# Patient Record
Sex: FEMALE | Race: WHITE
[De-identification: names, ages, dates, MRNs, and addresses within clinical notes are randomized per-mention and may not be internally consistent; named-entity substitution may affect disease eponyms.]

---

## 2023-03-16 ENCOUNTER — HOSPITAL ENCOUNTER (OUTPATIENT)
Dept: HOSPITAL 4 - SDS | Age: 38
Discharge: HOME | End: 2023-03-16
Attending: SURGERY
Payer: COMMERCIAL

## 2023-03-16 VITALS — HEIGHT: 59 IN | WEIGHT: 110 LBS | BODY MASS INDEX: 22.18 KG/M2

## 2023-03-16 VITALS — SYSTOLIC BLOOD PRESSURE: 108 MMHG

## 2023-03-16 DIAGNOSIS — Z20.822: ICD-10-CM

## 2023-03-16 DIAGNOSIS — K43.2: Primary | ICD-10-CM

## 2023-03-16 DIAGNOSIS — E03.9: ICD-10-CM

## 2023-03-16 LAB — HCG UR QL: NEGATIVE

## 2023-03-16 PROCEDURE — 36415 COLL VENOUS BLD VENIPUNCTURE: CPT

## 2023-03-16 PROCEDURE — 49623 RMVL NINFCT MESH HERNIA RPR: CPT

## 2023-03-16 PROCEDURE — 87426 SARSCOV CORONAVIRUS AG IA: CPT

## 2023-03-16 PROCEDURE — 88302 TISSUE EXAM BY PATHOLOGIST: CPT

## 2023-03-16 PROCEDURE — 84703 CHORIONIC GONADOTROPIN ASSAY: CPT

## 2023-03-16 PROCEDURE — C1781 MESH (IMPLANTABLE): HCPCS

## 2023-03-16 PROCEDURE — 49615 RPR AA HRN RCR 3-10 RDC: CPT

## 2023-03-16 PROCEDURE — C9290 INJ, BUPIVACAINE LIPOSOME: HCPCS

## 2023-03-16 PROCEDURE — 87081 CULTURE SCREEN ONLY: CPT

## 2025-07-23 ENCOUNTER — OFFICE (OUTPATIENT)
Dept: URBAN - METROPOLITAN AREA CLINIC 6 | Facility: CLINIC | Age: 40
End: 2025-07-23

## 2025-07-23 VITALS
HEIGHT: 59 IN | HEART RATE: 101 BPM | DIASTOLIC BLOOD PRESSURE: 88 MMHG | SYSTOLIC BLOOD PRESSURE: 136 MMHG | WEIGHT: 135 LBS

## 2025-07-23 DIAGNOSIS — R10.31 BILATERAL LOWER ABDOMINAL CRAMPING: ICD-10-CM

## 2025-07-23 DIAGNOSIS — R19.4 CHANGE IN BOWEL HABITS: ICD-10-CM

## 2025-07-23 DIAGNOSIS — F43.9 STRESS: ICD-10-CM

## 2025-07-23 PROCEDURE — G2211 COMPLEX E/M VISIT ADD ON: HCPCS | Performed by: INTERNAL MEDICINE

## 2025-07-23 PROCEDURE — 99204 OFFICE O/P NEW MOD 45 MIN: CPT | Performed by: INTERNAL MEDICINE

## 2025-07-23 NOTE — SERVICEHPINOTES
HPI:The patient is an 39-year-old female presenting for evaluation of mild lower abdominal cramping and changes in bowel habits ongoing since February.The patient describes a sensation of lower abdominal discomfort characterized as mild, crampy pain, often accompanied by the feeling of needing to have a bowel movement. This sensation persists even after defecation and does not improve post-bowel movement in fact, the patient reports that the cramps may worsen afterward. The discomfort is most noticeable when sitting or lying down and is absent when the patient is active or walking. The patient denies diarrhea and does not experience urgency or the need to rush to the bathroom. Stools are described as soft, typically corresponding to [place] type 4 or occasionally type 5, but not watery or loose enough to be considered diarrhea. Bowel movement frequency is approximately 4 to 5 times per week, with some days without a bowel movement, but the patient does not feel constipated on those days.The patient notes that these symptoms began around the same time as increased life stressors, including a new diagnosis of ADHD in her daughter and multiple family health issues, which have contributed to ongoing stress. The patient reports that during a recent 8- to 9-day trip to [place], when occupied and active, she did not notice any symptoms. She describes the overall severity of symptoms as very mild and has not required or used any medications for them.The patient reports a longstanding pattern of constipation since childhood, characterized by infrequent bowel movements without associated discomfort or a sense of constipation, but notes that her current bowel habits are more regular, though stools are softer and accompanied by mild abdominal discomfort. She has never undergone a colonoscopy.Diagnostics:- Laboratory Testing (most recent, 7/6/25): Normal findings reported.Patient was informed that the conversation was recorded for scribing purposes. Patient has given verbal consent.